# Patient Record
Sex: FEMALE | Race: WHITE
[De-identification: names, ages, dates, MRNs, and addresses within clinical notes are randomized per-mention and may not be internally consistent; named-entity substitution may affect disease eponyms.]

---

## 2020-03-04 ENCOUNTER — NURSE TRIAGE (OUTPATIENT)
Dept: OTHER | Facility: CLINIC | Age: 25
End: 2020-03-04

## 2020-03-04 NOTE — TELEPHONE ENCOUNTER
Reason for Disposition   SEVERE abdominal pain (e.g., excruciating)   SEVERE abdominal pain (e.g., excruciating) and present > 1 hour    Answer Assessment - Initial Assessment Questions  1. LOCATION: \"Where does it hurt? \"       Lower abd both sides    2. RADIATION: \"Does the pain shoot anywhere else? \" (e.g., chest, back)      Denies     3. ONSET: \"When did the pain begin? \" (e.g., minutes, hours or days ago)       Monday     4. SUDDEN: \"Gradual or sudden onset? \"      Sudden     5. PATTERN \"Does the pain come and go, or is it constant? \"     - If constant: \"Is it getting better, staying the same, or worsening? \"       (Note: Constant means the pain never goes away completely; most serious pain is constant and it progresses)      - If intermittent: \"How long does it last?\" \"Do you have pain now? \"      (Note: Intermittent means the pain goes away completely between bouts)      Constant     6. SEVERITY: \"How bad is the pain? \"  (e.g., Scale 1-10; mild, moderate, or severe)        - SEVERE (8-10): excruciating pain, doubled over, unable to do any normal activities         Severe    7. RECURRENT SYMPTOM: \"Have you ever had this type of abdominal pain before? \" If so, ask: \"When was the last time? \" and \"What happened that time? \"       Denies     8. CAUSE: \"What do you think is causing the abdominal pain? \"      Stomach virus possible     9. RELIEVING/AGGRAVATING FACTORS: \"What makes it better or worse? \" (e.g., movement, antacids, bowel movement)      Denies     10. OTHER SYMPTOMS: \"Has there been any vomiting, diarrhea, constipation, or urine problems? \"        Nausea, vomiting, diarrhea, shakes     11. PREGNANCY: \"Is there any chance you are pregnant? \" \"When was your last menstrual period? \"        Denies    Protocols used: ABDOMINAL PAIN - FEMALE-ADULT-[place], DIARRHEA-ADULT-[place]    Pt called with [employer] benefits    Pt called stating that she was in [facility] over the weekend and upon returning Monday she started with the shakes, vomiting x3, diarrhea and constant abd pain/cramping. Pt is unsure of fever. Pt states that she has been unable to keep fluids in her and feels dehydrated at this time. Caller reports symptoms as documented above. Caller informed of disposition. Care advice as documented. Pt verbalized understanding and agreeable to plan. Please do not respond to the triage nurse through this encounter. Any subsequent communication should be directly with the patient.